# Patient Record
Sex: FEMALE | Race: OTHER | URBAN - METROPOLITAN AREA
[De-identification: names, ages, dates, MRNs, and addresses within clinical notes are randomized per-mention and may not be internally consistent; named-entity substitution may affect disease eponyms.]

---

## 2024-04-18 ENCOUNTER — TELEPHONE (OUTPATIENT)
Dept: FAMILY MEDICINE CLINIC | Age: 69
End: 2024-04-18

## 2024-04-18 DIAGNOSIS — E87.5 HYPERKALEMIA: Primary | ICD-10-CM

## 2024-04-18 NOTE — TELEPHONE ENCOUNTER
Received PAT lab results from  with the request for us to review with our patient.  Dr. Feliciano requested an office visit to discuss and for her to avoid high potassium foods.  Patient states she is having angioplasty 4-22-24.  Patient was advised to contact the surgeon.  Patient was also advised to schedule an appt with us and she said she would call back.

## 2024-04-19 ENCOUNTER — TELEPHONE (OUTPATIENT)
Dept: FAMILY MEDICINE CLINIC | Age: 69
End: 2024-04-19

## 2024-04-19 NOTE — TELEPHONE ENCOUNTER
Advised Patient to have labs drawn today to check her potassium,  Advised patient to contact Dr. Cintron.    Spoke to SABINO Schneider for Dr. Cintron and they were going to take over the situtation.

## 2024-04-22 ENCOUNTER — TELEPHONE (OUTPATIENT)
Dept: FAMILY MEDICINE CLINIC | Age: 69
End: 2024-04-22

## 2024-04-22 NOTE — TELEPHONE ENCOUNTER
Jennie [facility] called and would like a call back concerning patients labs that were drawn 4-19-24. CBC and Potasium and the values need to be addressed

## 2024-04-22 NOTE — TELEPHONE ENCOUNTER
To Evelia Schneider, from [facility], is calling you back.

## 2024-04-22 NOTE — TELEPHONE ENCOUNTER
Beba, [facility], clinical liaison, here to reassess pt for possible admission.     Awaiting final determination.    ELIAS for Jennie. Spoke to Jennie on Friday regarding the results and how we had advised our patient to have her labs repeated..